# Patient Record
Sex: MALE | Race: WHITE | NOT HISPANIC OR LATINO
[De-identification: names, ages, dates, MRNs, and addresses within clinical notes are randomized per-mention and may not be internally consistent; named-entity substitution may affect disease eponyms.]

---

## 2017-01-30 ENCOUNTER — MEDICATION RENEWAL (OUTPATIENT)
Age: 57
End: 2017-01-30

## 2017-02-01 ENCOUNTER — APPOINTMENT (OUTPATIENT)
Dept: ELECTROPHYSIOLOGY | Facility: CLINIC | Age: 57
End: 2017-02-01

## 2017-02-01 ENCOUNTER — MEDICATION RENEWAL (OUTPATIENT)
Age: 57
End: 2017-02-01

## 2017-02-01 ENCOUNTER — NON-APPOINTMENT (OUTPATIENT)
Age: 57
End: 2017-02-01

## 2017-02-01 VITALS
BODY MASS INDEX: 33.6 KG/M2 | DIASTOLIC BLOOD PRESSURE: 74 MMHG | HEIGHT: 71 IN | WEIGHT: 240 LBS | SYSTOLIC BLOOD PRESSURE: 122 MMHG

## 2017-04-12 ENCOUNTER — RX RENEWAL (OUTPATIENT)
Age: 57
End: 2017-04-12

## 2017-06-14 ENCOUNTER — APPOINTMENT (OUTPATIENT)
Dept: ELECTROPHYSIOLOGY | Facility: CLINIC | Age: 57
End: 2017-06-14

## 2017-06-14 VITALS
BODY MASS INDEX: 33.6 KG/M2 | SYSTOLIC BLOOD PRESSURE: 111 MMHG | WEIGHT: 240 LBS | DIASTOLIC BLOOD PRESSURE: 77 MMHG | OXYGEN SATURATION: 95 % | HEART RATE: 83 BPM | HEIGHT: 71 IN

## 2017-08-15 ENCOUNTER — MEDICATION RENEWAL (OUTPATIENT)
Age: 57
End: 2017-08-15

## 2018-01-17 ENCOUNTER — MEDICATION RENEWAL (OUTPATIENT)
Age: 58
End: 2018-01-17

## 2018-02-12 ENCOUNTER — RX RENEWAL (OUTPATIENT)
Age: 58
End: 2018-02-12

## 2018-02-12 ENCOUNTER — APPOINTMENT (OUTPATIENT)
Dept: PULMONOLOGY | Facility: CLINIC | Age: 58
End: 2018-02-12
Payer: COMMERCIAL

## 2018-02-12 VITALS
SYSTOLIC BLOOD PRESSURE: 122 MMHG | RESPIRATION RATE: 17 BRPM | HEART RATE: 97 BPM | OXYGEN SATURATION: 96 % | BODY MASS INDEX: 35 KG/M2 | DIASTOLIC BLOOD PRESSURE: 80 MMHG | HEIGHT: 71 IN | WEIGHT: 250 LBS

## 2018-02-12 PROCEDURE — 94060 EVALUATION OF WHEEZING: CPT

## 2018-02-12 PROCEDURE — 99204 OFFICE O/P NEW MOD 45 MIN: CPT | Mod: 25

## 2018-02-12 PROCEDURE — 94729 DIFFUSING CAPACITY: CPT

## 2018-02-12 PROCEDURE — 94727 GAS DIL/WSHOT DETER LNG VOL: CPT

## 2018-02-12 RX ORDER — IPRATROPIUM BROMIDE AND ALBUTEROL SULFATE 2.5; .5 MG/3ML; MG/3ML
0.5-2.5 (3) SOLUTION RESPIRATORY (INHALATION)
Qty: 1 | Refills: 1 | Status: ACTIVE | COMMUNITY
Start: 2018-02-12 | End: 1900-01-01

## 2018-02-13 ENCOUNTER — MEDICATION RENEWAL (OUTPATIENT)
Age: 58
End: 2018-02-13

## 2018-02-14 ENCOUNTER — NON-APPOINTMENT (OUTPATIENT)
Age: 58
End: 2018-02-14

## 2018-02-14 ENCOUNTER — APPOINTMENT (OUTPATIENT)
Dept: ELECTROPHYSIOLOGY | Facility: CLINIC | Age: 58
End: 2018-02-14
Payer: COMMERCIAL

## 2018-02-14 VITALS
WEIGHT: 260 LBS | DIASTOLIC BLOOD PRESSURE: 87 MMHG | HEART RATE: 84 BPM | BODY MASS INDEX: 36.26 KG/M2 | SYSTOLIC BLOOD PRESSURE: 128 MMHG | OXYGEN SATURATION: 96 %

## 2018-02-14 PROCEDURE — 93000 ELECTROCARDIOGRAM COMPLETE: CPT

## 2018-02-14 PROCEDURE — 99214 OFFICE O/P EST MOD 30 MIN: CPT

## 2018-02-14 RX ORDER — ASPIRIN ENTERIC COATED TABLETS 81 MG 81 MG/1
81 TABLET, DELAYED RELEASE ORAL
Refills: 0 | Status: ACTIVE | COMMUNITY
Start: 2018-02-14

## 2018-02-19 ENCOUNTER — APPOINTMENT (OUTPATIENT)
Dept: PULMONOLOGY | Facility: CLINIC | Age: 58
End: 2018-02-19
Payer: COMMERCIAL

## 2018-02-19 VITALS
SYSTOLIC BLOOD PRESSURE: 130 MMHG | OXYGEN SATURATION: 98 % | HEIGHT: 71 IN | BODY MASS INDEX: 36.4 KG/M2 | WEIGHT: 260 LBS | HEART RATE: 88 BPM | RESPIRATION RATE: 17 BRPM | DIASTOLIC BLOOD PRESSURE: 102 MMHG

## 2018-02-19 DIAGNOSIS — Z87.09 PERSONAL HISTORY OF OTHER DISEASES OF THE RESPIRATORY SYSTEM: ICD-10-CM

## 2018-02-19 PROCEDURE — 99214 OFFICE O/P EST MOD 30 MIN: CPT | Mod: 25

## 2018-02-19 PROCEDURE — 71046 X-RAY EXAM CHEST 2 VIEWS: CPT

## 2018-02-19 RX ORDER — AZELASTINE HYDROCHLORIDE AND FLUTICASONE PROPIONATE 137; 50 UG/1; UG/1
137-50 SPRAY, METERED NASAL
Qty: 1 | Refills: 0 | Status: ACTIVE | COMMUNITY
Start: 2018-02-19 | End: 1900-01-01

## 2018-02-19 RX ORDER — PREDNISONE 20 MG/1
20 TABLET ORAL DAILY
Qty: 14 | Refills: 0 | Status: DISCONTINUED | COMMUNITY
Start: 2018-02-12 | End: 2018-02-19

## 2018-02-20 RX ORDER — FLUTICASONE PROPIONATE 50 UG/1
50 SPRAY, METERED NASAL DAILY
Qty: 1 | Refills: 0 | Status: ACTIVE | COMMUNITY
Start: 2018-02-20 | End: 1900-01-01

## 2018-02-20 RX ORDER — AZELASTINE HYDROCHLORIDE 137 UG/1
0.1 SPRAY, METERED NASAL DAILY
Qty: 1 | Refills: 0 | Status: ACTIVE | COMMUNITY
Start: 2018-02-20 | End: 1900-01-01

## 2018-03-28 ENCOUNTER — APPOINTMENT (OUTPATIENT)
Dept: PULMONOLOGY | Facility: CLINIC | Age: 58
End: 2018-03-28
Payer: COMMERCIAL

## 2018-03-28 VITALS
DIASTOLIC BLOOD PRESSURE: 80 MMHG | BODY MASS INDEX: 36.4 KG/M2 | HEART RATE: 76 BPM | HEIGHT: 71 IN | SYSTOLIC BLOOD PRESSURE: 120 MMHG | RESPIRATION RATE: 15 BRPM | WEIGHT: 260 LBS | OXYGEN SATURATION: 98 %

## 2018-03-28 DIAGNOSIS — J32.9 CHRONIC SINUSITIS, UNSPECIFIED: ICD-10-CM

## 2018-03-28 PROCEDURE — 99213 OFFICE O/P EST LOW 20 MIN: CPT

## 2018-05-11 RX ORDER — FLUTICASONE FUROATE, UMECLIDINIUM BROMIDE AND VILANTEROL TRIFENATATE 100; 62.5; 25 UG/1; UG/1; UG/1
100-62.5-25 POWDER RESPIRATORY (INHALATION) DAILY
Qty: 1 | Refills: 0 | Status: ACTIVE | COMMUNITY
Start: 2018-05-11 | End: 1900-01-01

## 2018-07-10 ENCOUNTER — APPOINTMENT (OUTPATIENT)
Dept: PULMONOLOGY | Facility: CLINIC | Age: 58
End: 2018-07-10
Payer: COMMERCIAL

## 2018-07-10 VITALS
OXYGEN SATURATION: 98 % | HEART RATE: 74 BPM | BODY MASS INDEX: 34.86 KG/M2 | RESPIRATION RATE: 15 BRPM | HEIGHT: 71 IN | SYSTOLIC BLOOD PRESSURE: 110 MMHG | WEIGHT: 249 LBS | DIASTOLIC BLOOD PRESSURE: 70 MMHG

## 2018-07-10 PROCEDURE — 99214 OFFICE O/P EST MOD 30 MIN: CPT

## 2018-07-23 ENCOUNTER — APPOINTMENT (OUTPATIENT)
Dept: PULMONOLOGY | Facility: CLINIC | Age: 58
End: 2018-07-23

## 2018-08-14 ENCOUNTER — RX RENEWAL (OUTPATIENT)
Age: 58
End: 2018-08-14

## 2018-08-16 ENCOUNTER — APPOINTMENT (OUTPATIENT)
Dept: PULMONOLOGY | Facility: CLINIC | Age: 58
End: 2018-08-16
Payer: COMMERCIAL

## 2018-08-16 VITALS
BODY MASS INDEX: 32.9 KG/M2 | HEIGHT: 71 IN | SYSTOLIC BLOOD PRESSURE: 110 MMHG | DIASTOLIC BLOOD PRESSURE: 70 MMHG | OXYGEN SATURATION: 95 % | HEART RATE: 81 BPM | WEIGHT: 235 LBS | RESPIRATION RATE: 18 BRPM | TEMPERATURE: 97 F

## 2018-08-16 PROCEDURE — 99244 OFF/OP CNSLTJ NEW/EST MOD 40: CPT | Mod: GC

## 2018-08-16 RX ORDER — METFORMIN ER 500 MG 500 MG/1
500 TABLET ORAL
Qty: 60 | Refills: 0 | Status: COMPLETED | COMMUNITY
Start: 2018-05-23

## 2018-08-16 RX ORDER — METFORMIN HYDROCHLORIDE 500 MG/1
500 TABLET, COATED ORAL
Qty: 60 | Refills: 0 | Status: COMPLETED | COMMUNITY
Start: 2018-05-03

## 2018-08-16 RX ORDER — FAMOTIDINE 40 MG/1
40 TABLET, FILM COATED ORAL
Qty: 30 | Refills: 0 | Status: COMPLETED | COMMUNITY
Start: 2018-05-12

## 2018-08-16 RX ORDER — AZELASTINE HYDROCHLORIDE 205.5 UG/1
0.15 SPRAY, METERED NASAL
Qty: 30 | Refills: 0 | Status: COMPLETED | COMMUNITY
Start: 2018-07-25

## 2018-08-16 RX ORDER — ERGOCALCIFEROL 1.25 MG/1
1.25 MG CAPSULE, LIQUID FILLED ORAL
Qty: 4 | Refills: 0 | Status: COMPLETED | COMMUNITY
Start: 2018-05-25

## 2018-10-17 ENCOUNTER — NON-APPOINTMENT (OUTPATIENT)
Age: 58
End: 2018-10-17

## 2018-10-17 ENCOUNTER — APPOINTMENT (OUTPATIENT)
Dept: ELECTROPHYSIOLOGY | Facility: CLINIC | Age: 58
End: 2018-10-17
Payer: COMMERCIAL

## 2018-10-17 VITALS — DIASTOLIC BLOOD PRESSURE: 82 MMHG | SYSTOLIC BLOOD PRESSURE: 121 MMHG | OXYGEN SATURATION: 98 % | HEART RATE: 69 BPM

## 2018-10-17 PROCEDURE — 99214 OFFICE O/P EST MOD 30 MIN: CPT

## 2018-10-17 PROCEDURE — 93000 ELECTROCARDIOGRAM COMPLETE: CPT

## 2018-10-17 RX ORDER — PREDNISONE 20 MG/1
20 TABLET ORAL DAILY
Qty: 14 | Refills: 1 | Status: DISCONTINUED | COMMUNITY
Start: 2018-07-10 | End: 2018-10-17

## 2019-01-04 ENCOUNTER — RX RENEWAL (OUTPATIENT)
Age: 59
End: 2019-01-04

## 2019-01-05 RX ORDER — POTASSIUM CHLORIDE 750 MG/1
10 TABLET, FILM COATED, EXTENDED RELEASE ORAL
Qty: 90 | Refills: 3 | Status: ACTIVE | COMMUNITY
Start: 2018-01-17 | End: 1900-01-01

## 2019-02-06 ENCOUNTER — MEDICATION RENEWAL (OUTPATIENT)
Age: 59
End: 2019-02-06

## 2019-05-23 ENCOUNTER — APPOINTMENT (OUTPATIENT)
Dept: PULMONOLOGY | Facility: CLINIC | Age: 59
End: 2019-05-23
Payer: COMMERCIAL

## 2019-05-23 VITALS
RESPIRATION RATE: 14 BRPM | OXYGEN SATURATION: 97 % | WEIGHT: 240 LBS | DIASTOLIC BLOOD PRESSURE: 82 MMHG | BODY MASS INDEX: 33.6 KG/M2 | HEIGHT: 71 IN | HEART RATE: 76 BPM | SYSTOLIC BLOOD PRESSURE: 118 MMHG

## 2019-05-23 DIAGNOSIS — J44.1 CHRONIC OBSTRUCTIVE PULMONARY DISEASE WITH (ACUTE) EXACERBATION: ICD-10-CM

## 2019-05-23 DIAGNOSIS — Z87.09 PERSONAL HISTORY OF OTHER DISEASES OF THE RESPIRATORY SYSTEM: ICD-10-CM

## 2019-05-23 DIAGNOSIS — G47.33 OBSTRUCTIVE SLEEP APNEA (ADULT) (PEDIATRIC): ICD-10-CM

## 2019-05-23 PROCEDURE — 99214 OFFICE O/P EST MOD 30 MIN: CPT

## 2019-05-23 NOTE — PHYSICAL EXAM
[General Appearance - Well Developed] : well developed [Normal Appearance] : normal appearance [General Appearance - Well Nourished] : well nourished [General Appearance - In No Acute Distress] : no acute distress [Normal Conjunctiva] : the conjunctiva exhibited no abnormalities [Eyelids - No Xanthelasma] : the eyelids demonstrated no xanthelasmas [Erythema] : erythema of the pharynx [Jugular Venous Distention Increased] : there was no jugular-venous distention [Murmurs] : no murmurs present [Cyanosis, Localized] : no localized cyanosis [Petechial Hemorrhages (___cm)] : no petechial hemorrhages [Non-Pitting] : non-pitting [Cranial Nerves] : cranial nerves 2-12 were intact [No Focal Deficits] : no focal deficits [Oriented To Time, Place, And Person] : oriented to person, place, and time [Impaired Insight] : insight and judgment were intact [Affect] : the affect was normal [Low Lying Soft Palate] : low lying soft palate [Enlarged Base of the Tongue] : enlargement of the base of the tongue [III] : III [Neck Appearance] : the appearance of the neck was normal [Neck Cervical Mass (___cm)] : no neck mass was observed [Apical Impulse] : the apical impulse was normal [Heart Rate And Rhythm] : heart rate was normal and rhythm regular [Heart Sounds] : normal S1 and S2 [Respiration, Rhythm And Depth] : normal respiratory rhythm and effort [Auscultation Breath Sounds / Voice Sounds] : lungs were clear to auscultation bilaterally [Bowel Sounds] : normal bowel sounds [Abdomen Soft] : soft [Abdomen Tenderness] : non-tender [Abnormal Walk] : normal gait [Nail Clubbing] : no clubbing  or cyanosis of the fingernails [Musculoskeletal - Swelling] : no joint swelling seen [Skin Color & Pigmentation] : normal skin color and pigmentation [Skin Turgor] : normal skin turgor [] : no rash

## 2019-05-23 NOTE — DISCUSSION/SUMMARY
[FreeTextEntry1] : 57 year old male Hx ALFREDO COPD, recurrent sinusitis presents for follow up doing well on Telergy and Flonase/Azelastine combination therapy.\par \par Continue Telergy for now, will consider less medication next visit\par \par Return 4 months or prn

## 2019-05-23 NOTE — REASON FOR VISIT
[Consultation] : a consultation visit [Sleep Apnea] : sleep apnea [Spouse] : spouse [FreeTextEntry1] : Dr. Vega

## 2019-05-23 NOTE — HISTORY OF PRESENT ILLNESS
[Obstructive Sleep Apnea] : obstructive sleep apnea [Snoring] : snoring [Frequent Nocturnal Awakening] : frequent nocturnal awakening [Daytime Somnolence] : daytime somnolence [ESS: ___] : ESS score [unfilled] [Unintentional Sleep While Inactive] : unintentional sleep while inactive [Awakes Unrefreshed] : awakening unrefreshed [Awakes with Headache] : headache upon awakening [Awakening With Dry Mouth] : awakening with dry mouth [Recent  Weight Gain] : recent weight gain [To Bed: ___] : ~he/she~ goes to bed at [unfilled] [Arises: ___] : arises at [unfilled] [Witnessed Apneas] : no witnessed sleep apnea [Unintentional Sleep while Active] : no unintentional sleep while active [FreeTextEntry1] : Mr. Oreilly is a 57 year old male recently diagnosed COPD, recurrent sinusitis, ALFREDO presents for follow up.  Patient reports he is experiencing increased chest congestion, cough.  He does use Telergy daily.  He denies chest pain fever or systemic complaints.

## 2019-05-23 NOTE — REVIEW OF SYSTEMS
[EDS: ESS=____] : daytime somnolence: ESS=[unfilled] [Snoring] : snoring [Obesity] : obesity [Diabetes] : diabetes  [A.M. Headache] : headache present upon awakening [Nocturia] : nocturia [Difficulty Maintaining Sleep] : difficulty maintaining sleep [Hypersomnolence] : sleeping much more than usual [Negative] : Psychiatric [Fever] : no fever [Chills] : no chills [Fatigue] : no fatigue [Poor Appetite] : normal appetite  [Recent Wt Gain (___ Lbs)] : no recent weight gain [Recent Wt Loss (___ Lbs)] : no recent weight loss [Dry Eyes] : no dryness of the eyes [Eye Irritation] : no ~T irritation of the eyes [Ear Disturbance] : no ear disturbance [Nasal Congestion] : no nasal congestion [Epistaxis] : no nosebleeds [Postnasal Drip] : no postnasal drip [Sinus Problems] : no sinus problems [Sore Throat] : no sore throat [Dry Mouth] : no dry mouth [Mouth Ulcers] : no mouth ulcers [Cough] : no cough [Sputum] : not coughing up ~M sputum [Hemoptysis] : no hemoptysis [Dyspnea] : no dyspnea [Chest Tightness] : no chest tightness [Pleuritic Pain] : no pleuritic pain [Frequent URIs] : no frequent upper respiratory infections [Wheezing] : no wheezing [de-identified] : Marcus ALFREDO [Witnessed Apneas] : no witnessed apnea

## 2019-05-23 NOTE — ASSESSMENT
[FreeTextEntry1] : 58 year old male Hx COPD, ALFREDO with increased chest congestion, cough, wheeze\par \par Continue Telergy as directed daily\par Initiate Doxycycline 100 mg twice daily\par Prednisone 20 mg 2 tabs daily x 7 days\par Nebulized medication as directed \par \par follow up 4 months or prn

## 2019-06-01 ENCOUNTER — APPOINTMENT (OUTPATIENT)
Dept: CARDIOLOGY | Facility: CLINIC | Age: 59
End: 2019-06-01

## 2019-06-12 ENCOUNTER — NON-APPOINTMENT (OUTPATIENT)
Age: 59
End: 2019-06-12

## 2019-06-12 ENCOUNTER — APPOINTMENT (OUTPATIENT)
Dept: CARDIOLOGY | Facility: CLINIC | Age: 59
End: 2019-06-12
Payer: COMMERCIAL

## 2019-06-12 VITALS
HEIGHT: 71 IN | HEART RATE: 76 BPM | SYSTOLIC BLOOD PRESSURE: 127 MMHG | WEIGHT: 240 LBS | RESPIRATION RATE: 15 BRPM | OXYGEN SATURATION: 95 % | BODY MASS INDEX: 33.6 KG/M2 | DIASTOLIC BLOOD PRESSURE: 81 MMHG

## 2019-06-12 PROCEDURE — 93000 ELECTROCARDIOGRAM COMPLETE: CPT

## 2019-06-12 PROCEDURE — 99214 OFFICE O/P EST MOD 30 MIN: CPT

## 2019-07-17 ENCOUNTER — TRANSCRIPTION ENCOUNTER (OUTPATIENT)
Age: 59
End: 2019-07-17

## 2019-07-18 NOTE — REVIEW OF SYSTEMS
[Fever] : no fever [Chills] : no chills [Feeling Fatigued] : not feeling fatigued [Blurry Vision] : no blurred vision [Sore Throat] : no sore throat [Shortness Of Breath] : no shortness of breath [Chest Pain] : no chest pain [Palpitations] : no palpitations [Cough] : no cough [Abdominal Pain] : no abdominal pain [Nausea] : no nausea [Pain During Urination] : no dysuria [Joint Pain] : no joint pain [Skin: A Rash] : no rash: [Dizziness] : no dizziness [Easy Bleeding] : no tendency for easy bleeding [Easy Bruising] : no tendency for easy bruising [Negative] : ENT

## 2019-07-18 NOTE — HISTORY OF PRESENT ILLNESS
[FreeTextEntry1] : 56 yo man with prior persistent AF. He is s/p AF ablation 2016 . He has history of HTN controlled on meds. He has been on propafanone and eliquis. He has history of COPD and ? Sleep disorder. \par Follow up 6/12/19: He is feeling well and has no complaints of  palpitations, chest pain, SOB,  dizziness, lightheadedness. Physically active without symptoms.\par \par Cardiac CT 5/2016: 30-50% plaque mid LAD

## 2019-07-18 NOTE — DISCUSSION/SUMMARY
[FreeTextEntry1] : He is s/p  catheter ablation for AF and no evidence of recurrent AF\par We Previously discussed discontinuation of AAD -  propafenone  mg bid. He has been feeling well and would prefer to continue medications for now. Recommend ILR and would consider discontinuing  eliquis. His CHADS Vasc score is based on HTN- BP has been controlled.\par Patient has a prior history of sleep apnea and recommend followup assessment and treatment\par

## 2019-07-18 NOTE — PHYSICAL EXAM
[General Appearance - Well Developed] : well developed [General Appearance - In No Acute Distress] : no acute distress [Normal Conjunctiva] : the conjunctiva exhibited no abnormalities [No Oral Pallor] : no oral pallor [Normal Jugular Venous V Waves Present] : normal jugular venous V waves present [Respiration, Rhythm And Depth] : normal respiratory rhythm and effort [Auscultation Breath Sounds / Voice Sounds] : lungs were clear to auscultation bilaterally [Heart Rate And Rhythm] : heart rate and rhythm were normal [Heart Sounds] : normal S1 and S2 [Murmurs] : no murmurs present [Arterial Pulses Normal] : the arterial pulses were normal [Edema] : no peripheral edema present [Abdomen Soft] : soft [Abdomen Tenderness] : non-tender [Abnormal Walk] : normal gait [Nail Clubbing] : no clubbing of the fingernails [Cyanosis, Localized] : no localized cyanosis [No Venous Stasis] : no venous stasis [Impaired Insight] : insight and judgment were intact

## 2019-12-16 ENCOUNTER — MEDICATION RENEWAL (OUTPATIENT)
Age: 59
End: 2019-12-16

## 2020-05-06 ENCOUNTER — APPOINTMENT (OUTPATIENT)
Dept: PULMONOLOGY | Facility: CLINIC | Age: 60
End: 2020-05-06

## 2020-07-06 ENCOUNTER — NON-APPOINTMENT (OUTPATIENT)
Age: 60
End: 2020-07-06

## 2020-07-06 ENCOUNTER — APPOINTMENT (OUTPATIENT)
Dept: ELECTROPHYSIOLOGY | Facility: CLINIC | Age: 60
End: 2020-07-06
Payer: COMMERCIAL

## 2020-07-06 VITALS
TEMPERATURE: 98.7 F | SYSTOLIC BLOOD PRESSURE: 114 MMHG | HEIGHT: 71 IN | OXYGEN SATURATION: 98 % | WEIGHT: 219 LBS | BODY MASS INDEX: 30.66 KG/M2 | HEART RATE: 70 BPM | DIASTOLIC BLOOD PRESSURE: 80 MMHG

## 2020-07-06 DIAGNOSIS — Z98.890 OTHER SPECIFIED POSTPROCEDURAL STATES: ICD-10-CM

## 2020-07-06 DIAGNOSIS — Z86.79 OTHER SPECIFIED POSTPROCEDURAL STATES: ICD-10-CM

## 2020-07-06 PROCEDURE — 93000 ELECTROCARDIOGRAM COMPLETE: CPT

## 2020-07-06 PROCEDURE — 99213 OFFICE O/P EST LOW 20 MIN: CPT

## 2020-07-06 RX ORDER — FLUTICASONE FUROATE, UMECLIDINIUM BROMIDE AND VILANTEROL TRIFENATATE 100; 62.5; 25 UG/1; UG/1; UG/1
100-62.5-25 POWDER RESPIRATORY (INHALATION) DAILY
Qty: 1 | Refills: 0 | Status: DISCONTINUED | OUTPATIENT
Start: 2018-02-12 | End: 2020-07-06

## 2020-07-06 RX ORDER — AZELASTINE HYDROCHLORIDE 137 UG/1
0.1 SPRAY, METERED NASAL DAILY
Qty: 1 | Refills: 3 | Status: DISCONTINUED | COMMUNITY
Start: 2018-03-28 | End: 2020-07-06

## 2020-07-06 RX ORDER — DOXYCYCLINE 100 MG/1
100 TABLET, FILM COATED ORAL TWICE DAILY
Qty: 14 | Refills: 0 | Status: DISCONTINUED | COMMUNITY
Start: 2018-07-10 | End: 2020-07-06

## 2020-07-06 RX ORDER — PREDNISONE 20 MG/1
20 TABLET ORAL DAILY
Qty: 14 | Refills: 0 | Status: DISCONTINUED | COMMUNITY
Start: 2019-05-23 | End: 2020-07-06

## 2020-07-06 RX ORDER — DOXYCYCLINE 100 MG/1
100 CAPSULE ORAL
Qty: 14 | Refills: 0 | Status: DISCONTINUED | COMMUNITY
Start: 2019-05-23 | End: 2020-07-06

## 2020-07-09 ENCOUNTER — APPOINTMENT (OUTPATIENT)
Dept: PULMONOLOGY | Facility: CLINIC | Age: 60
End: 2020-07-09
Payer: COMMERCIAL

## 2020-07-09 VITALS
WEIGHT: 218 LBS | TEMPERATURE: 97.8 F | OXYGEN SATURATION: 98 % | BODY MASS INDEX: 30.52 KG/M2 | HEART RATE: 65 BPM | RESPIRATION RATE: 15 BRPM | SYSTOLIC BLOOD PRESSURE: 120 MMHG | HEIGHT: 71 IN | DIASTOLIC BLOOD PRESSURE: 80 MMHG

## 2020-07-09 PROCEDURE — 99214 OFFICE O/P EST MOD 30 MIN: CPT

## 2020-07-09 PROCEDURE — G0296 VISIT TO DETERM LDCT ELIG: CPT

## 2020-07-09 NOTE — PHYSICAL EXAM
[General Appearance - Well Developed] : well developed [Normal Appearance] : normal appearance [Normal Conjunctiva] : the conjunctiva exhibited no abnormalities [General Appearance - Well Nourished] : well nourished [General Appearance - In No Acute Distress] : no acute distress [Eyelids - No Xanthelasma] : the eyelids demonstrated no xanthelasmas [Erythema] : erythema of the pharynx [Jugular Venous Distention Increased] : there was no jugular-venous distention [Murmurs] : no murmurs present [Petechial Hemorrhages (___cm)] : no petechial hemorrhages [Cyanosis, Localized] : no localized cyanosis [Non-Pitting] : non-pitting [Cranial Nerves] : cranial nerves 2-12 were intact [Impaired Insight] : insight and judgment were intact [No Focal Deficits] : no focal deficits [Oriented To Time, Place, And Person] : oriented to person, place, and time [Low Lying Soft Palate] : low lying soft palate [Affect] : the affect was normal [Enlarged Base of the Tongue] : enlargement of the base of the tongue [III] : III [Neck Appearance] : the appearance of the neck was normal [Apical Impulse] : the apical impulse was normal [Neck Cervical Mass (___cm)] : no neck mass was observed [Heart Rate And Rhythm] : heart rate was normal and rhythm regular [Heart Sounds] : normal S1 and S2 [Respiration, Rhythm And Depth] : normal respiratory rhythm and effort [Auscultation Breath Sounds / Voice Sounds] : lungs were clear to auscultation bilaterally [Bowel Sounds] : normal bowel sounds [Abdomen Tenderness] : non-tender [Abdomen Soft] : soft [Abnormal Walk] : normal gait [Nail Clubbing] : no clubbing  or cyanosis of the fingernails [Musculoskeletal - Swelling] : no joint swelling seen [Skin Color & Pigmentation] : normal skin color and pigmentation [Skin Turgor] : normal skin turgor [] : no rash

## 2020-07-09 NOTE — REVIEW OF SYSTEMS
[EDS: ESS=____] : daytime somnolence: ESS=[unfilled] [Snoring] : snoring [Obesity] : obesity [A.M. Headache] : headache present upon awakening [Diabetes] : diabetes  [Nocturia] : nocturia [Difficulty Maintaining Sleep] : difficulty maintaining sleep [Hypersomnolence] : sleeping much more than usual [Negative] : Musculoskeletal [Fever] : no fever [Chills] : no chills [Fatigue] : no fatigue [Poor Appetite] : normal appetite  [Recent Wt Gain (___ Lbs)] : no recent weight gain [Recent Wt Loss (___ Lbs)] : no recent weight loss [Dry Eyes] : no dryness of the eyes [Eye Irritation] : no ~T irritation of the eyes [Ear Disturbance] : no ear disturbance [Nasal Congestion] : no nasal congestion [Epistaxis] : no nosebleeds [Postnasal Drip] : no postnasal drip [Sore Throat] : no sore throat [Sinus Problems] : no sinus problems [Mouth Ulcers] : no mouth ulcers [Dry Mouth] : no dry mouth [Hemoptysis] : no hemoptysis [Cough] : no cough [Sputum] : not coughing up ~M sputum [Dyspnea] : no dyspnea [Chest Tightness] : no chest tightness [Pleuritic Pain] : no pleuritic pain [Frequent URIs] : no frequent upper respiratory infections [Wheezing] : no wheezing [Witnessed Apneas] : no witnessed apnea [de-identified] : Marcus ALFREDO

## 2020-07-09 NOTE — HISTORY OF PRESENT ILLNESS
[Never] : never [Former] : former [Obstructive Sleep Apnea] : obstructive sleep apnea [Snoring] : snoring [Frequent Nocturnal Awakening] : frequent nocturnal awakening [ESS: ___] : ESS score [unfilled] [Daytime Somnolence] : daytime somnolence [Unintentional Sleep While Inactive] : unintentional sleep while inactive [Awakes with Headache] : headache upon awakening [Awakes Unrefreshed] : awakening unrefreshed [Awakening With Dry Mouth] : awakening with dry mouth [Recent  Weight Gain] : recent weight gain [To Bed: ___] : ~he/she~ goes to bed at [unfilled] [Arises: ___] : arises at [unfilled] [TextBox_4] : Mr. Oreilly is a 57 year old male recently diagnosed COPD, recurrent sinusitis, ALFREDO presents for follow up.  He has lost significant amount of weight since retiring in [place].  He exercises and is now quite active  Patient uses Telergy daily.  He denies chest pain fever or systemic complaints. [TextBox_11] : 1.5 [TextBox_13] : 30 [YearQuit] : 2012 [Witnessed Apneas] : no witnessed sleep apnea [Unintentional Sleep while Active] : no unintentional sleep while active

## 2020-07-09 NOTE — ASSESSMENT
[FreeTextEntry1] : 58 year old male Hx COPD, ALFREDO with increased chest congestion, cough, wheeze\par \par Continue Telergy as directed daily\par CT Lung Cancer Screening discussed with patient \par Nebulized medication as directed \par \par Follow up after CT chest

## 2020-07-09 NOTE — COUNSELING
[ - Annual Lung Cancer Screening/Share Decision Making Discussion] : Annual Lung Cancer Screening/Share Decision Making Discussion. (I have advised this patient to have a Low Dose CT (LDCT) scan of the lungs and have discussed the following with the patient in a shared decision making discussion:   Benefits of Detection and Early Treatment: There is adequate evidence that annual screening for lung cancer with LDCT in a population of high-risk persons can prevent a substantial number of lung cancer–related deaths. The magnitude of benefit depends on the individual patient's risk for lung cancer, as those who are at highest risk are most likely to benefit. Screening cannot prevent most lung cancer–related deaths, and does not replace smoking cessation. Harms of Detection and Early Intervention and Treatment: The harms associated with LDCT screening include false-negative and false-positive results, incidental findings, over diagnosis, and radiation exposure. False-positive LDCT results occur in a substantial proportion of screened persons; 95% of all positive results do not lead to a diagnosis of cancer. In a high-quality screening program, further imaging can resolve most false-positive results; however, some patients may require invasive procedures. Radiation harms, including cancer resulting from cumulative exposure to radiation, vary depending on the age at the start of screening; the number of scans received; and the person's exposure to other sources of radiation, particularly other medical imaging.) [None] : None [Good understanding] : Patient has a good understanding of lifestyle changes and steps needed to achieve self management goal

## 2020-07-09 NOTE — REASON FOR VISIT
[COPD] : COPD [Follow-Up] : a follow-up visit [Sleep Apnea] : sleep apnea [Spouse] : spouse [FreeTextEntry1] : Dr. Vega

## 2020-07-12 PROBLEM — Z98.890 S/P ABLATION OF ATRIAL FIBRILLATION: Status: ACTIVE | Noted: 2018-11-19

## 2020-07-12 NOTE — HISTORY OF PRESENT ILLNESS
[FreeTextEntry1] : Patient is a 59-year-old man with a prior history of persistent A. fib and status post catheter ablation for A. fib in 2016.  He has not had any clinical recurrence and the patient has been on propafenone to 25 mg twice daily.  He has been feeling well and denies any chest pain, shortness of breath, dizziness, lightens, syncope, presyncope or palpitations.  He has no exertional symptoms.  Patient recently traveled from [place] -prior to the recent covered outbreak in [place] and travel restrictions.   He has history of HTN controlled on meds. He has been on propafanone and eliquis. He has history of COPD and ? Sleep disorder. \par \par Cardiac CT 5/2016: 30-50% plaque mid LAD

## 2020-07-12 NOTE — PHYSICAL EXAM
[General Appearance - Well Developed] : well developed [No Oral Pallor] : no oral pallor [Normal Conjunctiva] : the conjunctiva exhibited no abnormalities [General Appearance - In No Acute Distress] : no acute distress [Normal Jugular Venous V Waves Present] : normal jugular venous V waves present [Respiration, Rhythm And Depth] : normal respiratory rhythm and effort [Auscultation Breath Sounds / Voice Sounds] : lungs were clear to auscultation bilaterally [Heart Rate And Rhythm] : heart rate and rhythm were normal [Heart Sounds] : normal S1 and S2 [Arterial Pulses Normal] : the arterial pulses were normal [Murmurs] : no murmurs present [Abdomen Soft] : soft [Edema] : no peripheral edema present [Abdomen Tenderness] : non-tender [Abnormal Walk] : normal gait [Nail Clubbing] : no clubbing of the fingernails [Cyanosis, Localized] : no localized cyanosis [No Venous Stasis] : no venous stasis [Impaired Insight] : insight and judgment were intact

## 2020-07-12 NOTE — DISCUSSION/SUMMARY
[FreeTextEntry1] : He is s/p  catheter ablation for AF and no evidence of recurrent AF\par We Previously discussed discontinuation of AAD -  propafenone  mg bid.  We will discontinue this drug in a few months when the COVID pandemic is more controlled.  The patient is concerned about the possibility of going back A. fib and he would prefer not to be hospitalized or have a cardioversion.  We also previously discussed the possibility of placing an ILR and potentially discontinuing Eliquis.. His CHADS Vasc score is based on HTN- BP has been controlled on losartan/hydrochlorothiazide.\par Patient has a prior history of sleep apnea and recommend followup assessment and treatment.\par \par He also has a prior history of mild coronary disease and will obtain a follow-up CT scan to reassess his course.  Recommend follow-up echocardiogram as well.  He will follow-up with his primary care physician regarding his laboratory testing including lipid profile.\par

## 2020-07-12 NOTE — REVIEW OF SYSTEMS
[Negative] : Eyes [Fever] : no fever [Chills] : no chills [Feeling Fatigued] : not feeling fatigued [Blurry Vision] : no blurred vision [Sore Throat] : no sore throat [Shortness Of Breath] : no shortness of breath [Chest Pain] : no chest pain [Palpitations] : no palpitations [Abdominal Pain] : no abdominal pain [Cough] : no cough [Nausea] : no nausea [Pain During Urination] : no dysuria [Skin: A Rash] : no rash: [Joint Pain] : no joint pain [Easy Bleeding] : no tendency for easy bleeding [Dizziness] : no dizziness [Easy Bruising] : no tendency for easy bruising

## 2020-08-03 ENCOUNTER — APPOINTMENT (OUTPATIENT)
Dept: CARDIOLOGY | Facility: CLINIC | Age: 60
End: 2020-08-03

## 2020-08-03 ENCOUNTER — RESULT REVIEW (OUTPATIENT)
Age: 60
End: 2020-08-03

## 2020-08-03 ENCOUNTER — OUTPATIENT (OUTPATIENT)
Dept: OUTPATIENT SERVICES | Facility: HOSPITAL | Age: 60
LOS: 1 days | End: 2020-08-03
Payer: COMMERCIAL

## 2020-08-03 DIAGNOSIS — Z90.89 ACQUIRED ABSENCE OF OTHER ORGANS: Chronic | ICD-10-CM

## 2020-08-03 DIAGNOSIS — R07.9 CHEST PAIN, UNSPECIFIED: ICD-10-CM

## 2020-08-03 DIAGNOSIS — I48.91 UNSPECIFIED ATRIAL FIBRILLATION: Chronic | ICD-10-CM

## 2020-08-03 DIAGNOSIS — Z98.890 OTHER SPECIFIED POSTPROCEDURAL STATES: ICD-10-CM

## 2020-08-03 PROCEDURE — 75574 CT ANGIO HRT W/3D IMAGE: CPT | Mod: 26

## 2020-08-03 PROCEDURE — 75574 CT ANGIO HRT W/3D IMAGE: CPT

## 2020-12-11 ENCOUNTER — APPOINTMENT (OUTPATIENT)
Dept: CARDIOLOGY | Facility: CLINIC | Age: 60
End: 2020-12-11

## 2020-12-11 ENCOUNTER — APPOINTMENT (OUTPATIENT)
Dept: ELECTROPHYSIOLOGY | Facility: CLINIC | Age: 60
End: 2020-12-11
Payer: COMMERCIAL

## 2020-12-11 DIAGNOSIS — Z98.890 OTHER SPECIFIED POSTPROCEDURAL STATES: ICD-10-CM

## 2020-12-11 PROCEDURE — 99213 OFFICE O/P EST LOW 20 MIN: CPT

## 2020-12-11 PROCEDURE — 93000 ELECTROCARDIOGRAM COMPLETE: CPT

## 2020-12-11 PROCEDURE — 99072 ADDL SUPL MATRL&STAF TM PHE: CPT

## 2020-12-16 PROBLEM — Z87.09 HISTORY OF ACUTE SINUSITIS: Status: RESOLVED | Noted: 2018-02-12 | Resolved: 2020-12-16

## 2020-12-21 PROBLEM — Z87.09 HISTORY OF ACUTE BRONCHITIS: Status: RESOLVED | Noted: 2018-07-10 | Resolved: 2020-12-21

## 2020-12-27 ENCOUNTER — NON-APPOINTMENT (OUTPATIENT)
Age: 60
End: 2020-12-27

## 2020-12-27 NOTE — HISTORY OF PRESENT ILLNESS
[FreeTextEntry1] : Follow-up today prompted because he felt some skipped beats and his wife who is a nurse practitioner thought he might be in A. fib.  The patient is feeling well now and has no symptoms of palpitations, chest pain, dizziness, lightheadedness, syncope or presyncope.  He has been active without symptoms.\par \par Prior history: Patient is a 59-year-old man with a prior history of persistent A. fib and status post catheter ablation for A. fib in 2016.  He has not had any clinical recurrence and the patient has been on propafenone to 225 mg twice daily.   He has history of HTN controlled on meds. He has been on propafanone and eliquis. He has history of COPD and ? Sleep disorder. \par \par Cardiac CT 5/2016: 30-50% plaque mid LAD

## 2020-12-27 NOTE — REVIEW OF SYSTEMS
[Fever] : no fever [Chills] : no chills [Feeling Fatigued] : not feeling fatigued [Blurry Vision] : no blurred vision [Sore Throat] : no sore throat [see HPI] : see HPI [Shortness Of Breath] : no shortness of breath [Chest Pain] : no chest pain [Palpitations] : palpitations [Cough] : no cough [Abdominal Pain] : no abdominal pain [Nausea] : no nausea [Pain During Urination] : no dysuria [Joint Pain] : no joint pain [Skin: A Rash] : no rash: [Dizziness] : no dizziness [Easy Bleeding] : no tendency for easy bleeding [Easy Bruising] : no tendency for easy bruising [Negative] : ENT

## 2020-12-27 NOTE — DISCUSSION/SUMMARY
[FreeTextEntry1] : Electrocardiograms of sinus rhythm with first-degree AV block.  He is not having symptoms at this time.  We will recommend an extended monitoring should he have recurrence of symptoms.  Recommended he continue current medications including propafenone as well as Eliquis.   His CHADS Vasc score is based on HTN- BP has been controlled on losartan/hydrochlorothiazide.\par Patient has a prior history of sleep apnea and recommend followup assessment and treatment.\par \par He also has a prior history of mild coronary disease and recommend follow-up  CT scan.  Patient is return to [place] and he will see his physicians there and was seen in follow-up when he returns in early summer.

## 2021-02-05 ENCOUNTER — NON-APPOINTMENT (OUTPATIENT)
Age: 61
End: 2021-02-05

## 2021-03-26 ENCOUNTER — TRANSCRIPTION ENCOUNTER (OUTPATIENT)
Age: 61
End: 2021-03-26

## 2021-03-29 ENCOUNTER — APPOINTMENT (OUTPATIENT)
Age: 61
End: 2021-03-29
Payer: COMMERCIAL

## 2021-03-29 VITALS
BODY MASS INDEX: 29.49 KG/M2 | TEMPERATURE: 98 F | OXYGEN SATURATION: 98 % | HEART RATE: 70 BPM | RESPIRATION RATE: 16 BRPM | SYSTOLIC BLOOD PRESSURE: 130 MMHG | DIASTOLIC BLOOD PRESSURE: 80 MMHG | WEIGHT: 206 LBS | HEIGHT: 70 IN

## 2021-03-29 PROCEDURE — G0296 VISIT TO DETERM LDCT ELIG: CPT

## 2021-03-29 PROCEDURE — 94010 BREATHING CAPACITY TEST: CPT

## 2021-03-29 PROCEDURE — ZZZZZ: CPT

## 2021-03-29 PROCEDURE — 99072 ADDL SUPL MATRL&STAF TM PHE: CPT

## 2021-03-29 PROCEDURE — 94729 DIFFUSING CAPACITY: CPT

## 2021-03-29 PROCEDURE — 99214 OFFICE O/P EST MOD 30 MIN: CPT | Mod: 25

## 2021-03-29 NOTE — PROCEDURE
[FreeTextEntry1] : CXR personally reviewed 2/19/2018 [facility].\par \par \par PFT 2/12/2018 Mild obstructive ventilatory defect without gas exchange abnormality without a  significant bronchodilator response\par \par SpirometryDLCO 3/29/21 personaly reviewed minimal obstructive ventilatory defect without gas exchange abnormality

## 2021-03-29 NOTE — PHYSICAL EXAM
[General Appearance - Well Developed] : well developed [Normal Appearance] : normal appearance [General Appearance - Well Nourished] : well nourished [General Appearance - In No Acute Distress] : no acute distress [Normal Conjunctiva] : the conjunctiva exhibited no abnormalities [Eyelids - No Xanthelasma] : the eyelids demonstrated no xanthelasmas [Erythema] : erythema of the pharynx [Neck Appearance] : the appearance of the neck was normal [Jugular Venous Distention Increased] : there was no jugular-venous distention [Murmurs] : no murmurs present [] : no respiratory distress [Respiration, Rhythm And Depth] : normal respiratory rhythm and effort [Auscultation Breath Sounds / Voice Sounds] : lungs were clear to auscultation bilaterally [Abdomen Soft] : soft [Abnormal Walk] : normal gait [Nail Clubbing] : no clubbing of the fingernails [Cyanosis, Localized] : no localized cyanosis [Petechial Hemorrhages (___cm)] : no petechial hemorrhages [Non-Pitting] : non-pitting [Skin Color & Pigmentation] : normal skin color and pigmentation [Cranial Nerves] : cranial nerves 2-12 were intact [No Focal Deficits] : no focal deficits [Oriented To Time, Place, And Person] : oriented to person, place, and time [Impaired Insight] : insight and judgment were intact [Affect] : the affect was normal [Low Lying Soft Palate] : low lying soft palate [Enlarged Base of the Tongue] : enlargement of the base of the tongue [III] : III [Neck Cervical Mass (___cm)] : no neck mass was observed [Heart Rate And Rhythm] : heart rate was normal and rhythm regular [Heart Sounds] : normal S1 and S2

## 2021-03-29 NOTE — HISTORY OF PRESENT ILLNESS
[Former] : former [Never] : never [Obstructive Sleep Apnea] : obstructive sleep apnea [Snoring] : snoring [Frequent Nocturnal Awakening] : frequent nocturnal awakening [Daytime Somnolence] : daytime somnolence [ESS: ___] : ESS score [unfilled] [Unintentional Sleep While Inactive] : unintentional sleep while inactive [Awakes Unrefreshed] : awakening unrefreshed [Awakes with Headache] : headache upon awakening [Awakening With Dry Mouth] : awakening with dry mouth [Recent  Weight Gain] : recent weight gain [To Bed: ___] : ~he/she~ goes to bed at [unfilled] [Arises: ___] : arises at [unfilled] [TextBox_4] : Mr. Oreilly is a 57 year old maleformer smoker, Hx  COPD, recurrent sinusitis, ALFREDO presents for follow up.  He has lost significant amount of weight since retiring in [place].  He exercises and is now quite active  Patient uses Telergy daily.  He reports no increased respiratory symptoms.  He is here for follow up. [TextBox_11] : 1.5 [TextBox_13] : 30 [YearQuit] : 2012 [Witnessed Apneas] : no witnessed sleep apnea [Unintentional Sleep while Active] : no unintentional sleep while active

## 2021-03-29 NOTE — ASSESSMENT
[FreeTextEntry1] : 60 year old male former smoker, Hx COPD, ALFREDO intolerant of CPAP presents for follow up, doping well on Trelegy\par \par Continue Telergy as directed daily\par CT Lung Cancer Screening discussed with patient \par Nebulized medication as directed \par \par Follow up pending CT Lung Cancer Screen

## 2021-03-29 NOTE — COUNSELING
[None] : None [Good understanding] : Patient has a good understanding of lifestyle changes and steps needed to achieve self management goal [ - Annual Lung Cancer Screening/Share Decision Making Discussion] : Annual Lung Cancer Screening/Share Decision Making Discussion. (I have advised this patient to have a Low Dose CT (LDCT) scan of the lungs and have discussed the following with the patient in a shared decision making discussion:   Benefits of Detection and Early Treatment: There is adequate evidence that annual screening for lung cancer with LDCT in a population of high-risk persons can prevent a substantial number of lung cancer–related deaths. The magnitude of benefit depends on the individual patient's risk for lung cancer, as those who are at highest risk are most likely to benefit. Screening cannot prevent most lung cancer–related deaths, and does not replace smoking cessation. Harms of Detection and Early Intervention and Treatment: The harms associated with LDCT screening include false-negative and false-positive results, incidental findings, over diagnosis, and radiation exposure. False-positive LDCT results occur in a substantial proportion of screened persons; 95% of all positive results do not lead to a diagnosis of cancer. In a high-quality screening program, further imaging can resolve most false-positive results; however, some patients may require invasive procedures. Radiation harms, including cancer resulting from cumulative exposure to radiation, vary depending on the age at the start of screening; the number of scans received; and the person's exposure to other sources of radiation, particularly other medical imaging.)

## 2021-03-29 NOTE — REVIEW OF SYSTEMS
[EDS: ESS=____] : daytime somnolence: ESS=[unfilled] [Snoring] : snoring [Obesity] : obesity [Diabetes] : diabetes  [A.M. Headache] : headache present upon awakening [Nocturia] : nocturia [Difficulty Maintaining Sleep] : difficulty maintaining sleep [Hypersomnolence] : sleeping much more than usual [Negative] : Psychiatric [Fever] : no fever [Chills] : no chills [Fatigue] : no fatigue [Poor Appetite] : normal appetite  [Recent Wt Gain (___ Lbs)] : no recent weight gain [Recent Wt Loss (___ Lbs)] : no recent weight loss [Dry Eyes] : no dryness of the eyes [Eye Irritation] : no ~T irritation of the eyes [Ear Disturbance] : no ear disturbance [Nasal Congestion] : no nasal congestion [Epistaxis] : no nosebleeds [Postnasal Drip] : no postnasal drip [Sinus Problems] : no sinus problems [Sore Throat] : no sore throat [Dry Mouth] : no dry mouth [Mouth Ulcers] : no mouth ulcers [Cough] : no cough [Sputum] : not coughing up ~M sputum [Hemoptysis] : no hemoptysis [Dyspnea] : no dyspnea [Chest Tightness] : no chest tightness [Pleuritic Pain] : no pleuritic pain [Frequent URIs] : no frequent upper respiratory infections [Wheezing] : no wheezing [de-identified] : Marcus ALFREDO [Witnessed Apneas] : no witnessed apnea

## 2021-03-29 NOTE — REASON FOR VISIT
[Spouse] : spouse [Follow-Up] : a follow-up visit [Sleep Apnea] : sleep apnea [COPD] : COPD [FreeTextEntry1] : Dr. Vega

## 2021-04-06 ENCOUNTER — NON-APPOINTMENT (OUTPATIENT)
Age: 61
End: 2021-04-06

## 2021-04-06 VITALS — HEIGHT: 70 IN | BODY MASS INDEX: 29.49 KG/M2 | WEIGHT: 206 LBS

## 2021-04-06 DIAGNOSIS — Z12.2 ENCOUNTER FOR SCREENING FOR MALIGNANT NEOPLASM OF RESPIRATORY ORGANS: ICD-10-CM

## 2021-04-06 NOTE — PLAN
[FreeTextEntry1] : His LDCT is scheduled for 4/7/21 at the [facility].  He will follow up with Dr. Vega for the results.

## 2021-04-06 NOTE — HISTORY OF PRESENT ILLNESS
[Former] : former smoker [_____ pack-years] : [unfilled] pack-years [TextBox_13] : He is a former smoker with a 30 pack year smoking history (1PPD x30 years). He quit in 2012. He is referred by Dr. Yanni Vega who documented the shared decision making.

## 2021-04-07 ENCOUNTER — OUTPATIENT (OUTPATIENT)
Dept: OUTPATIENT SERVICES | Facility: HOSPITAL | Age: 61
LOS: 1 days | End: 2021-04-07
Payer: COMMERCIAL

## 2021-04-07 ENCOUNTER — TRANSCRIPTION ENCOUNTER (OUTPATIENT)
Age: 61
End: 2021-04-07

## 2021-04-07 ENCOUNTER — APPOINTMENT (OUTPATIENT)
Dept: CT IMAGING | Facility: CLINIC | Age: 61
End: 2021-04-07
Payer: COMMERCIAL

## 2021-04-07 DIAGNOSIS — I48.91 UNSPECIFIED ATRIAL FIBRILLATION: Chronic | ICD-10-CM

## 2021-04-07 DIAGNOSIS — Z90.89 ACQUIRED ABSENCE OF OTHER ORGANS: Chronic | ICD-10-CM

## 2021-04-07 DIAGNOSIS — G47.33 OBSTRUCTIVE SLEEP APNEA (ADULT) (PEDIATRIC): ICD-10-CM

## 2021-04-07 PROCEDURE — 71271 CT THORAX LUNG CANCER SCR C-: CPT | Mod: 26

## 2021-04-07 PROCEDURE — 71271 CT THORAX LUNG CANCER SCR C-: CPT

## 2021-06-18 RX ORDER — FLUTICASONE FUROATE, UMECLIDINIUM BROMIDE AND VILANTEROL TRIFENATATE 100; 62.5; 25 UG/1; UG/1; UG/1
100-62.5-25 POWDER RESPIRATORY (INHALATION) DAILY
Qty: 1 | Refills: 3 | Status: ACTIVE | COMMUNITY
Start: 2018-03-28 | End: 1900-01-01

## 2021-06-29 ENCOUNTER — APPOINTMENT (OUTPATIENT)
Dept: PULMONOLOGY | Facility: CLINIC | Age: 61
End: 2021-06-29
Payer: COMMERCIAL

## 2021-06-29 VITALS
HEIGHT: 71 IN | RESPIRATION RATE: 16 BRPM | WEIGHT: 210 LBS | SYSTOLIC BLOOD PRESSURE: 120 MMHG | BODY MASS INDEX: 29.4 KG/M2 | TEMPERATURE: 97.5 F | DIASTOLIC BLOOD PRESSURE: 80 MMHG | HEART RATE: 64 BPM | OXYGEN SATURATION: 99 %

## 2021-06-29 DIAGNOSIS — J44.9 OBSTRUCTIVE SLEEP APNEA (ADULT) (PEDIATRIC): ICD-10-CM

## 2021-06-29 DIAGNOSIS — G47.33 OBSTRUCTIVE SLEEP APNEA (ADULT) (PEDIATRIC): ICD-10-CM

## 2021-06-29 DIAGNOSIS — Z87.891 PERSONAL HISTORY OF NICOTINE DEPENDENCE: ICD-10-CM

## 2021-06-29 PROCEDURE — 99214 OFFICE O/P EST MOD 30 MIN: CPT

## 2021-06-29 PROCEDURE — 99072 ADDL SUPL MATRL&STAF TM PHE: CPT

## 2021-06-29 NOTE — REASON FOR VISIT
[Abnormal CXR/ Chest CT] : an abnormal CXR/ chest CT [Pulmonary Nodules] : pulmonary nodules [Spouse] : spouse [Follow-Up] : a follow-up visit [COPD] : COPD [Sleep Apnea] : sleep apnea [FreeTextEntry1] : Dr. Vega

## 2021-06-29 NOTE — PHYSICAL EXAM
[General Appearance - Well Developed] : well developed [Normal Appearance] : normal appearance [General Appearance - Well Nourished] : well nourished [General Appearance - In No Acute Distress] : no acute distress [Normal Conjunctiva] : the conjunctiva exhibited no abnormalities [Eyelids - No Xanthelasma] : the eyelids demonstrated no xanthelasmas [Erythema] : erythema of the pharynx [Neck Appearance] : the appearance of the neck was normal [Jugular Venous Distention Increased] : there was no jugular-venous distention [Murmurs] : no murmurs present [Respiration, Rhythm And Depth] : normal respiratory rhythm and effort [] : no respiratory distress [Auscultation Breath Sounds / Voice Sounds] : lungs were clear to auscultation bilaterally [Abdomen Soft] : soft [Abnormal Walk] : normal gait [Nail Clubbing] : no clubbing of the fingernails [Cyanosis, Localized] : no localized cyanosis [Petechial Hemorrhages (___cm)] : no petechial hemorrhages [Non-Pitting] : non-pitting [Skin Color & Pigmentation] : normal skin color and pigmentation [Cranial Nerves] : cranial nerves 2-12 were intact [No Focal Deficits] : no focal deficits [Oriented To Time, Place, And Person] : oriented to person, place, and time [Impaired Insight] : insight and judgment were intact [Affect] : the affect was normal [Low Lying Soft Palate] : low lying soft palate [Enlarged Base of the Tongue] : enlargement of the base of the tongue [III] : III [Neck Cervical Mass (___cm)] : no neck mass was observed [Heart Rate And Rhythm] : heart rate was normal and rhythm regular [Heart Sounds] : normal S1 and S2

## 2021-06-29 NOTE — REVIEW OF SYSTEMS
[EDS: ESS=____] : daytime somnolence: ESS=[unfilled] [Snoring] : snoring [Obesity] : obesity [Diabetes] : diabetes  [A.M. Headache] : headache present upon awakening [Nocturia] : nocturia [Difficulty Maintaining Sleep] : difficulty maintaining sleep [Hypersomnolence] : sleeping much more than usual [Negative] : Psychiatric [Fever] : no fever [Chills] : no chills [Fatigue] : no fatigue [Poor Appetite] : normal appetite  [Recent Wt Gain (___ Lbs)] : no recent weight gain [Recent Wt Loss (___ Lbs)] : no recent weight loss [Dry Eyes] : no dryness of the eyes [Eye Irritation] : no ~T irritation of the eyes [Ear Disturbance] : no ear disturbance [Nasal Congestion] : no nasal congestion [Epistaxis] : no nosebleeds [Postnasal Drip] : no postnasal drip [Sinus Problems] : no sinus problems [Sore Throat] : no sore throat [Dry Mouth] : no dry mouth [Mouth Ulcers] : no mouth ulcers [Cough] : no cough [Sputum] : not coughing up ~M sputum [Hemoptysis] : no hemoptysis [Dyspnea] : no dyspnea [Chest Tightness] : no chest tightness [Pleuritic Pain] : no pleuritic pain [Frequent URIs] : no frequent upper respiratory infections [Wheezing] : no wheezing [de-identified] : Marcus ALFREDO [Witnessed Apneas] : no witnessed apnea

## 2021-06-29 NOTE — PROCEDURE
[FreeTextEntry1] : CXR personally reviewed 2/19/2018 [facility].\par \par \par PFT 2/12/2018 Mild obstructive ventilatory defect without gas exchange abnormality without a  significant bronchodilator response\par \par Spirometry/DLCO 3/29/21 personally reviewed minimal obstructive ventilatory defect without gas exchange abnormality\par \par LDCT chest 4/7/21 personally reviewed 6 mm opacity along minor fissure unchanged since 8/3/20, 2 mm noncalcified nodule LLL

## 2021-06-29 NOTE — ASSESSMENT
[FreeTextEntry1] : 60 year old male former smoker, Hx COPD, ALFREDO intolerant of CPAP presents for follow up, doping well on Trelegy\par \par Continue Telergy as directed daily\par Repeat CT Lung Cancer Screening 4/2022\par Nebulized medication as directed \par \par Follow up 6 months after CT Lung Cancer screen

## 2022-07-22 NOTE — HISTORY REVIEWED
Total Volume Injected (Ccs- Only Use Numbers And Decimals): 0.5 [History reviewed] : History reviewed. [Medications and Allergies reviewed] : Medications and allergies reviewed.

## 2023-12-14 ENCOUNTER — NON-APPOINTMENT (OUTPATIENT)
Age: 63
End: 2023-12-14